# Patient Record
Sex: FEMALE | Race: WHITE | URBAN - METROPOLITAN AREA
[De-identification: names, ages, dates, MRNs, and addresses within clinical notes are randomized per-mention and may not be internally consistent; named-entity substitution may affect disease eponyms.]

---

## 2021-02-22 ENCOUNTER — OFFICE VISIT (OUTPATIENT)
Dept: ORTHOPEDIC SURGERY | Age: 56
End: 2021-02-22
Payer: COMMERCIAL

## 2021-02-22 DIAGNOSIS — M77.01 MEDIAL EPICONDYLITIS OF RIGHT ELBOW: ICD-10-CM

## 2021-02-22 DIAGNOSIS — M25.521 RIGHT ELBOW PAIN: Primary | ICD-10-CM

## 2021-02-22 PROCEDURE — 3017F COLORECTAL CA SCREEN DOC REV: CPT | Performed by: ORTHOPAEDIC SURGERY

## 2021-02-22 PROCEDURE — 99204 OFFICE O/P NEW MOD 45 MIN: CPT | Performed by: ORTHOPAEDIC SURGERY

## 2021-02-22 PROCEDURE — G8428 CUR MEDS NOT DOCUMENT: HCPCS | Performed by: ORTHOPAEDIC SURGERY

## 2021-02-22 PROCEDURE — G8484 FLU IMMUNIZE NO ADMIN: HCPCS | Performed by: ORTHOPAEDIC SURGERY

## 2021-02-22 PROCEDURE — MISCD86 TENNIS ELBOW STRAP-BREG: Performed by: ORTHOPAEDIC SURGERY

## 2021-02-22 PROCEDURE — 4004F PT TOBACCO SCREEN RCVD TLK: CPT | Performed by: ORTHOPAEDIC SURGERY

## 2021-02-22 PROCEDURE — G8421 BMI NOT CALCULATED: HCPCS | Performed by: ORTHOPAEDIC SURGERY

## 2021-02-22 NOTE — PROGRESS NOTES
Not on file    Drug use: Not on file    Sexual activity: Not on file   Lifestyle    Physical activity     Days per week: Not on file     Minutes per session: Not on file    Stress: Not on file   Relationships    Social connections     Talks on phone: Not on file     Gets together: Not on file     Attends [employer] service: Not on file     Active member of club or organization: Not on file     Attends meetings of clubs or organizations: Not on file     Relationship status: Not on file    Intimate partner violence     Fear of current or ex partner: Not on file     Emotionally abused: Not on file     Physically abused: Not on file     Forced sexual activity: Not on file   Other Topics Concern    Not on file   Social History Narrative    Not on file       Current Outpatient Medications   Medication Sig Dispense Refill    fexofenadine (ALLEGRA) 180 MG tablet Take 180 mg by mouth daily.  ibuprofen (ADVIL;MOTRIN) 200 MG CAPS Take 2 capsules by mouth.  fluticasone (FLONASE) 50 MCG/ACT nasal spray 1 spray by Nasal route daily. No current facility-administered medications for this visit. No Known Allergies    Vital signs: There were no vitals taken for this visit. Neuro: Alert & oriented x 3,  normal,  no focal deficits noted. Normal affect. Eyes: sclera clear  Ears: Normal external ear  Mouth:  No perioral lesions  Pulm: Respirations unlabored and regular  Pulse: Extremities well perfused. 2+ peripheral pulses. Skin: Warm. No ulcerations. Constitutional: The physical examination finds the patient to be well-developed and well-nourished. The patient is alert and oriented x3 and was cooperative throughout the visit. Right Elbow Examination:    Inspection:    Normal alignment. No swelling. Palpation:     Medial epicondyle tenderness    Range of Motion:      0-135 degrees. Strength:       5/5 in all muscle groups. Pain with resisted supination.    weakness    Instability testing:  Stable to varus and valgus stress    Vascular exam:    Skin warm and well-perfused. Neurologic exam:     Sensation intact to light    Left Elbow Comparison Examination:    Inspection:    Normal alignment. No swelling. Healed surgical incisions    Palpation:     No tenderness to palpation    Range of Motion:      0-135 degrees. Strength:       5/5 in all muscle groups    Instability testing:  Stable to varus and valgus stress    Vascular exam:    Skin warm and well-perfused. Neurologic exam:     Sensation intact to light      Diagnostics:  Radiology:     Pertinent imaging was obtained, interpreted, and reviewed with the patient, images only - no report available. Right elbow x-ray:    AP, lateral and oblique views were obtained  and reviewed of the right elbow. Impression: Lateral epicondyle chronic calcification of extensor tendons     Office Procedures:  Orders Placed This Encounter   Procedures    Aircast Arm Band $20     Patient was supplied a Aircast Arm Band. This retail item was supplied to provide functional support and assist in protecting the affected area. Verbal and written instructions for the use of and application of this item were provided. The patient was educated and fit by a healthcare professional with expert knowledge and specialization in brace application. They were instructed to contact the office immediately should the equipment result in increased pain, decreased sensation, increased swelling or worsening of the condition.  XR ELBOW RIGHT (MIN 3 VIEWS)     Order Specific Question:   Reason for exam:     Answer:   pain       Assessment: 55 yo female with right medial epicondylitis    Plan: Pertinent imaging was reviewed. The etiology, natural history, and treatment options for the disorder were discussed.   The roles of activity medication, antiinflammatories, injections, bracing, physical therapy, and surgical interventions